# Patient Record
Sex: MALE | Race: WHITE | Employment: FULL TIME | URBAN - METROPOLITAN AREA
[De-identification: names, ages, dates, MRNs, and addresses within clinical notes are randomized per-mention and may not be internally consistent; named-entity substitution may affect disease eponyms.]

---

## 2017-03-06 RX ORDER — ZOLPIDEM TARTRATE 10 MG/1
TABLET ORAL
Qty: 30 TABLET | Refills: 1 | Status: SHIPPED | OUTPATIENT
Start: 2017-03-06

## 2024-07-22 ENCOUNTER — HOSPITAL ENCOUNTER (EMERGENCY)
Age: 64
Discharge: HOME | End: 2024-07-22
Payer: MEDICARE

## 2024-07-22 VITALS
TEMPERATURE: 98.1 F | DIASTOLIC BLOOD PRESSURE: 70 MMHG | SYSTOLIC BLOOD PRESSURE: 130 MMHG | OXYGEN SATURATION: 97 % | RESPIRATION RATE: 12 BRPM | WEIGHT: 177.47 LBS | HEART RATE: 76 BPM | BODY MASS INDEX: 24.75 KG/M2

## 2024-07-22 DIAGNOSIS — L03.113 CELLULITIS OF RIGHT HAND EXCLUDING FINGERS AND THUMB: Primary | ICD-10-CM

## 2024-07-22 LAB
ANION GAP SERPL CALCULATED.3IONS-SCNC: 12 MMOL/L (ref 3–16)
BASOPHILS # BLD: 0.1 K/UL (ref 0–0.2)
BASOPHILS NFR BLD: 0.7 %
BUN SERPL-MCNC: 35 MG/DL (ref 7–20)
CALCIUM SERPL-MCNC: 10.3 MG/DL (ref 8.3–10.6)
CHLORIDE SERPL-SCNC: 98 MMOL/L (ref 99–110)
CO2 SERPL-SCNC: 24 MMOL/L (ref 21–32)
CREAT SERPL-MCNC: 1.2 MG/DL (ref 0.8–1.3)
DEPRECATED RDW RBC AUTO: 13.5 % (ref 12.4–15.4)
EOSINOPHIL # BLD: 0.9 K/UL (ref 0–0.6)
EOSINOPHIL NFR BLD: 7.4 %
GFR SERPLBLD CREATININE-BSD FMLA CKD-EPI: 67 ML/MIN/{1.73_M2}
GLUCOSE SERPL-MCNC: 120 MG/DL (ref 70–99)
HCT VFR BLD AUTO: 41.8 % (ref 40.5–52.5)
HGB BLD-MCNC: 14.5 G/DL (ref 13.5–17.5)
LACTATE BLDV-SCNC: 1.2 MMOL/L (ref 0.4–2)
LYMPHOCYTES # BLD: 1.4 K/UL (ref 1–5.1)
LYMPHOCYTES NFR BLD: 10.6 %
MCH RBC QN AUTO: 30.7 PG (ref 26–34)
MCHC RBC AUTO-ENTMCNC: 34.6 G/DL (ref 31–36)
MCV RBC AUTO: 88.6 FL (ref 80–100)
MONOCYTES # BLD: 1.2 K/UL (ref 0–1.3)
MONOCYTES NFR BLD: 9.5 %
NEUTROPHILS # BLD: 9.2 K/UL (ref 1.7–7.7)
NEUTROPHILS NFR BLD: 71.8 %
PLATELET # BLD AUTO: 324 K/UL (ref 135–450)
PMV BLD AUTO: 7.3 FL (ref 5–10.5)
POTASSIUM SERPL-SCNC: 3.9 MMOL/L (ref 3.5–5.1)
RBC # BLD AUTO: 4.72 M/UL (ref 4.2–5.9)
SODIUM SERPL-SCNC: 134 MMOL/L (ref 136–145)
WBC # BLD AUTO: 12.9 K/UL (ref 4–11)

## 2024-07-22 PROCEDURE — 83605 ASSAY OF LACTIC ACID: CPT

## 2024-07-22 PROCEDURE — 85025 COMPLETE CBC W/AUTO DIFF WBC: CPT

## 2024-07-22 PROCEDURE — 6370000000 HC RX 637 (ALT 250 FOR IP)

## 2024-07-22 PROCEDURE — 80048 BASIC METABOLIC PNL TOTAL CA: CPT

## 2024-07-22 PROCEDURE — 99283 EMERGENCY DEPT VISIT LOW MDM: CPT

## 2024-07-22 RX ORDER — CEPHALEXIN 500 MG/1
500 CAPSULE ORAL ONCE
Status: COMPLETED | OUTPATIENT
Start: 2024-07-22 | End: 2024-07-22

## 2024-07-22 RX ORDER — NAPROXEN 250 MG/1
500 TABLET ORAL ONCE
Status: COMPLETED | OUTPATIENT
Start: 2024-07-22 | End: 2024-07-22

## 2024-07-22 RX ORDER — CEPHALEXIN 500 MG/1
500 CAPSULE ORAL 2 TIMES DAILY
Qty: 14 CAPSULE | Refills: 0 | Status: SHIPPED | OUTPATIENT
Start: 2024-07-22 | End: 2024-07-28

## 2024-07-22 RX ORDER — NAPROXEN SODIUM 550 MG/1
550 TABLET ORAL 2 TIMES DAILY WITH MEALS
Qty: 60 TABLET | Refills: 3 | Status: SHIPPED | OUTPATIENT
Start: 2024-07-22 | End: 2024-07-28

## 2024-07-22 RX ADMIN — NAPROXEN SODIUM 500 MG: 250 TABLET ORAL at 23:22

## 2024-07-22 RX ADMIN — CEPHALEXIN 500 MG: 500 CAPSULE ORAL at 22:23

## 2024-07-23 ASSESSMENT — ENCOUNTER SYMPTOMS
SINUS PRESSURE: 0
SORE THROAT: 0
SINUS PAIN: 0
WHEEZING: 0
ABDOMINAL PAIN: 0
NAUSEA: 0
RHINORRHEA: 0
VOMITING: 0
TROUBLE SWALLOWING: 0
CHEST TIGHTNESS: 0
SHORTNESS OF BREATH: 0
COUGH: 0
COLOR CHANGE: 1

## 2024-07-23 NOTE — ED PROVIDER NOTES
[facility] EMERGENCY DEPARTMENT  EMERGENCY DEPARTMENT ENCOUNTER        Pt Name: Drew Chisholm  MRN: [id number]  Birthdate 1960  Date of evaluation: 7/22/2024  Provider: DC Quinones - CNP  PCP: Jean-Paul Alatorre MD  Note Started: 7:56 PM EDT 7/23/24      DIANA. I have evaluated this patient.        CHIEF COMPLAINT       Chief Complaint   Patient presents with    Hand Pain     Right hand swelling.  Righ thand pain that radiated up to upper arm.   Onset of of swelling this am.  Hand is warm to the touch.        HISTORY OF PRESENT ILLNESS: 1 or more Elements     History From: Patient    Chief Complaint: Right hand pain    Drew Chisholm is a 64 y.o. male who presents to the emergency department for evaluation of right hand pain.  First noted this morning upon awakening.  Denies any preceding traumatic injury.  States that he also noted some redness and warmth to the touch.  Does not believe that he sustained any insect stings or insect bites.  Patient is right-hand dominant nondiabetic.  No associated fevers, chills, nausea, vomiting.    Nursing Notes were all reviewed and agreed with or any disagreements were addressed in the HPI.    REVIEW OF SYSTEMS :      Review of Systems   Constitutional:  Negative for chills, fatigue and fever.   HENT:  Negative for congestion, postnasal drip, rhinorrhea, sinus pressure, sinus pain, sneezing, sore throat and trouble swallowing.    Respiratory:  Negative for cough, chest tightness, shortness of breath and wheezing.    Cardiovascular:  Negative for chest pain and palpitations.   Gastrointestinal:  Negative for abdominal pain, nausea and vomiting.   Musculoskeletal:  Positive for myalgias. Negative for arthralgias and neck pain.   Skin:  Positive for color change.   Allergic/Immunologic: Negative for immunocompromised state.   Neurological:  Negative for dizziness, weakness, light-headedness and headaches.   Hematological:  Does not bruise/bleed

## 2024-07-23 NOTE — DISCHARGE INSTRUCTIONS
Blood work was reassuring and I am not concerned for systemic infection.  Exam is most consistent with a localized cellulitis to the dorsum of the right hand.  You were given your first dose of antibiotics in the emergency department a prescription was sent to your pharmacy for completion.   and take as directed.  Make sure to complete entirely.  Do not stop early if symptoms improve or resolve.    I did send naproxen which is a pain medication.  It is in the same drug family as Motrin, ibuprofen, Aleve, Advil, meloxicam, diclofenac.  You can take Tylenol with this medication but do not take additional nonsteroidal anti-inflammatory drugs.  Elevate and apply ice 2-3 times a day for 10 to 15 minutes at a time.  Schedule follow-up with your primary care provider in 2 to 3 days for wound check.    Please return if worse and you are not able to get in with your primary care doctor for reevaluation

## 2024-07-28 ENCOUNTER — HOSPITAL ENCOUNTER (EMERGENCY)
Age: 64
Discharge: HOME | End: 2024-07-28
Payer: MEDICARE

## 2024-07-28 VITALS
TEMPERATURE: 98.3 F | OXYGEN SATURATION: 98 % | HEIGHT: 70 IN | SYSTOLIC BLOOD PRESSURE: 134 MMHG | RESPIRATION RATE: 16 BRPM | WEIGHT: 168.87 LBS | DIASTOLIC BLOOD PRESSURE: 77 MMHG | BODY MASS INDEX: 24.18 KG/M2 | HEART RATE: 62 BPM

## 2024-07-28 DIAGNOSIS — M25.50 POLYARTHRALGIA: Primary | ICD-10-CM

## 2024-07-28 DIAGNOSIS — R79.82 ELEVATED C-REACTIVE PROTEIN (CRP): ICD-10-CM

## 2024-07-28 DIAGNOSIS — R70.0 ELEVATED SED RATE: ICD-10-CM

## 2024-07-28 DIAGNOSIS — E79.0 ELEVATED URIC ACID IN BLOOD: ICD-10-CM

## 2024-07-28 DIAGNOSIS — D72.829 LEUKOCYTOSIS, UNSPECIFIED TYPE: ICD-10-CM

## 2024-07-28 LAB
ANION GAP SERPL CALCULATED.3IONS-SCNC: 13 MMOL/L (ref 3–16)
BASOPHILS # BLD: 0.1 K/UL (ref 0–0.2)
BASOPHILS NFR BLD: 1.1 %
BUN SERPL-MCNC: 30 MG/DL (ref 7–20)
CALCIUM SERPL-MCNC: 9.6 MG/DL (ref 8.3–10.6)
CHLORIDE SERPL-SCNC: 98 MMOL/L (ref 99–110)
CO2 SERPL-SCNC: 22 MMOL/L (ref 21–32)
CREAT SERPL-MCNC: 1.1 MG/DL (ref 0.8–1.3)
CRP SERPL-MCNC: 144.4 MG/L (ref 0–5.1)
DEPRECATED RDW RBC AUTO: 13.7 % (ref 12.4–15.4)
EOSINOPHIL # BLD: 1.6 K/UL (ref 0–0.6)
EOSINOPHIL NFR BLD: 12.3 %
ERYTHROCYTE [SEDIMENTATION RATE] IN BLOOD BY WESTERGREN METHOD: 96 MM/HR (ref 0–20)
GFR SERPLBLD CREATININE-BSD FMLA CKD-EPI: 74 ML/MIN/{1.73_M2}
GLUCOSE SERPL-MCNC: 152 MG/DL (ref 70–99)
HCT VFR BLD AUTO: 38.9 % (ref 40.5–52.5)
HGB BLD-MCNC: 13.2 G/DL (ref 13.5–17.5)
LYMPHOCYTES # BLD: 1.4 K/UL (ref 1–5.1)
LYMPHOCYTES NFR BLD: 10.8 %
MCH RBC QN AUTO: 30.1 PG (ref 26–34)
MCHC RBC AUTO-ENTMCNC: 34 G/DL (ref 31–36)
MCV RBC AUTO: 88.5 FL (ref 80–100)
MONOCYTES # BLD: 1 K/UL (ref 0–1.3)
MONOCYTES NFR BLD: 7.6 %
NEUTROPHILS # BLD: 8.8 K/UL (ref 1.7–7.7)
NEUTROPHILS NFR BLD: 68.2 %
PLATELET # BLD AUTO: 520 K/UL (ref 135–450)
PMV BLD AUTO: 6.8 FL (ref 5–10.5)
POTASSIUM SERPL-SCNC: 3.7 MMOL/L (ref 3.5–5.1)
RBC # BLD AUTO: 4.4 M/UL (ref 4.2–5.9)
RHEUMATOID FACT SER IA-ACNC: 19 IU/ML
SODIUM SERPL-SCNC: 133 MMOL/L (ref 136–145)
URATE SERPL-MCNC: 10 MG/DL (ref 3.5–7.2)
WBC # BLD AUTO: 12.9 K/UL (ref 4–11)

## 2024-07-28 PROCEDURE — 85652 RBC SED RATE AUTOMATED: CPT

## 2024-07-28 PROCEDURE — 86038 ANTINUCLEAR ANTIBODIES: CPT

## 2024-07-28 PROCEDURE — 99283 EMERGENCY DEPT VISIT LOW MDM: CPT

## 2024-07-28 PROCEDURE — 6370000000 HC RX 637 (ALT 250 FOR IP): Performed by: NURSE PRACTITIONER

## 2024-07-28 PROCEDURE — 80048 BASIC METABOLIC PNL TOTAL CA: CPT

## 2024-07-28 PROCEDURE — 84550 ASSAY OF BLOOD/URIC ACID: CPT

## 2024-07-28 PROCEDURE — 85025 COMPLETE CBC W/AUTO DIFF WBC: CPT

## 2024-07-28 PROCEDURE — 86140 C-REACTIVE PROTEIN: CPT

## 2024-07-28 PROCEDURE — 86431 RHEUMATOID FACTOR QUANT: CPT

## 2024-07-28 RX ORDER — COLCHICINE 0.6 MG/1
1.2 TABLET ORAL ONCE
Status: COMPLETED | OUTPATIENT
Start: 2024-07-28 | End: 2024-07-28

## 2024-07-28 RX ORDER — PREDNISONE 20 MG/1
TABLET ORAL
Qty: 18 TABLET | Refills: 0 | Status: SHIPPED | OUTPATIENT
Start: 2024-07-28 | End: 2024-08-07

## 2024-07-28 RX ORDER — MELOXICAM 7.5 MG/1
7.5 TABLET ORAL DAILY
Qty: 10 TABLET | Refills: 0 | Status: SHIPPED | OUTPATIENT
Start: 2024-07-28 | End: 2024-08-07

## 2024-07-28 RX ORDER — COLCHICINE 0.6 MG/1
0.6 TABLET ORAL 2 TIMES DAILY
Qty: 10 TABLET | Refills: 0 | Status: SHIPPED | OUTPATIENT
Start: 2024-07-28 | End: 2024-08-02

## 2024-07-28 RX ORDER — MELOXICAM 7.5 MG/1
7.5 TABLET ORAL ONCE
Status: COMPLETED | OUTPATIENT
Start: 2024-07-28 | End: 2024-07-28

## 2024-07-28 RX ORDER — PREDNISONE 20 MG/1
60 TABLET ORAL ONCE
Status: COMPLETED | OUTPATIENT
Start: 2024-07-28 | End: 2024-07-28

## 2024-07-28 RX ADMIN — COLCHICINE 1.2 MG: 0.6 TABLET, FILM COATED ORAL at 22:23

## 2024-07-28 RX ADMIN — PREDNISONE 60 MG: 20 TABLET ORAL at 20:23

## 2024-07-28 RX ADMIN — MELOXICAM 7.5 MG: 7.5 TABLET ORAL at 22:23

## 2024-07-28 ASSESSMENT — PAIN SCALES - GENERAL
PAINLEVEL_OUTOF10: 1
PAINLEVEL_OUTOF10: 3
PAINLEVEL_OUTOF10: 6

## 2024-07-28 ASSESSMENT — PAIN - FUNCTIONAL ASSESSMENT
PAIN_FUNCTIONAL_ASSESSMENT: 0-10
PAIN_FUNCTIONAL_ASSESSMENT: 0-10

## 2024-07-28 NOTE — ED PROVIDER NOTES
[facility] EMERGENCY DEPARTMENT  [address] [address]  Dept: [phone]  Loc: [phone]  eMERGENCYdEPARTMENT eNCOUnter      Pt Name: Drew Chisholm  MRN: [id number]  Birthdate 1960  Date of evaluation: 7/28/2024  Provider:DC Dotson CNP      Independently seen and evaluated by the advanced practice per  CHIEF COMPLAINT       Chief Complaint   Patient presents with    Joint Pain     Patient was seen and treated Monday for cellulitis and states that he is still having pain in his joints, his hands are the worse, but has pain in his shoulders, knees, and back 6/10        CRITICAL CARE TIME       HISTORY OF PRESENT ILLNESS  (Location/Symptom, Timing/Onset, Context/Setting, Quality, Duration,Modifying Factors, Severity.)   Drew Chisholm is a 64 y.o. male who presents to the emergency department PMHx acid reflux hypertension hyperlipidemia    Lives at home  CODE STATUS full  Social determinants: He is retired but also has a job at [employer].  Reports he has a very physical person.  Anticoagulation therapy none    HPI provided by the patient:     Patient presents to the emergency department reporting that he was seen here several days ago and was prescribed antibiotic for right hand cellulitis.  He states that his knuckles were swollen and sore but he had no prior trauma or injury.  He reports that after taking the antibiotic for couple days and the symptoms seem to improve however and since today he has noticed that the right hand is swollen, not as much as it was before.  And he is experiencing bilateral elbow shoulder and bilateral knee pain.  States he is never experienced this before.      Nursing Notes were reviewedand agreed with or any disagreements were addressed in the HPI.    REVIEW OF SYSTEMS    (2-9 systems for level 4, 10 or more for level 5)     Review of Systems   Constitutional:  Negative for activity

## 2024-07-28 NOTE — ED TRIAGE NOTES
.Drew Chisholm is a 64 y.o. male brought himself to the ER for eval of joint pain. The patient states that he was treated here Tuesday for cellulitis and is having joint pain. The patient states that the pain is worse in his hands but also in his knees, shoulders, and back. The patient states that the pain is a 6/10. The patient is alert and oriented with an open and patent airway with a normal respiratory effort.

## 2024-07-29 LAB — ANA SER QL IA: NEGATIVE

## 2024-07-29 NOTE — ED NOTES
D/C: Order noted for d/c. Pt confirmed d/c paperwork has correct name. Discharge and education instructions reviewed with patient. Teach-back successful.  Pt verbalized understanding and denied questions at this time. No acute distress noted. Patient instructed to follow-up as noted - return to emergency department if symptoms worsen. Patient verbalized understanding. Discharged per EDMD with discharge instructions. Pt discharged to private vehicle. Patient stable upon departure. Thanked patient for [facility] for care. Provider aware of patient pain at time of discharge.

## 2024-07-29 NOTE — ED NOTES
D/C: Order noted for d/c. Pt confirmed d/c paperwork has correct name. Discharge and education instructions reviewed with patient. Teach-back successful.  Pt verbalized understanding and denied questions at this time. No acute distress noted. Patient instructed to follow-up as noted - return to emergency department if symptoms worsen. Patient verbalized understanding. Discharged per EDMD with discharge instructions. Pt discharged to private vehicle. Patient stable upon departure. Thanked patient for [facility] for care. Provider aware of patient pain at time of discharge.

## 2024-07-30 ASSESSMENT — ENCOUNTER SYMPTOMS
GASTROINTESTINAL NEGATIVE: 1
RESPIRATORY NEGATIVE: 1

## 2024-08-15 ENCOUNTER — APPOINTMENT (OUTPATIENT)
Dept: GENERAL RADIOLOGY | Age: 64
End: 2024-08-15
Payer: MEDICARE

## 2024-08-15 ENCOUNTER — HOSPITAL ENCOUNTER (EMERGENCY)
Age: 64
Discharge: HOME | End: 2024-08-15
Payer: MEDICARE

## 2024-08-15 VITALS
WEIGHT: 165.57 LBS | DIASTOLIC BLOOD PRESSURE: 70 MMHG | SYSTOLIC BLOOD PRESSURE: 114 MMHG | RESPIRATION RATE: 16 BRPM | TEMPERATURE: 98.7 F | BODY MASS INDEX: 23.7 KG/M2 | HEART RATE: 68 BPM | OXYGEN SATURATION: 96 % | HEIGHT: 70 IN

## 2024-08-15 DIAGNOSIS — M25.551 PAIN OF RIGHT HIP: Primary | ICD-10-CM

## 2024-08-15 DIAGNOSIS — M25.511 ACUTE PAIN OF RIGHT SHOULDER: ICD-10-CM

## 2024-08-15 LAB
ANION GAP SERPL CALCULATED.3IONS-SCNC: 11 MMOL/L (ref 3–16)
BASOPHILS # BLD: 0 K/UL (ref 0–0.2)
BASOPHILS NFR BLD: 0 %
BUN SERPL-MCNC: 26 MG/DL (ref 7–20)
CALCIUM SERPL-MCNC: 8.7 MG/DL (ref 8.3–10.6)
CHLORIDE SERPL-SCNC: 90 MMOL/L (ref 99–110)
CO2 SERPL-SCNC: 24 MMOL/L (ref 21–32)
CREAT SERPL-MCNC: 1 MG/DL (ref 0.8–1.3)
CRP SERPL-MCNC: 156 MG/L (ref 0–5.1)
DEPRECATED RDW RBC AUTO: 13.8 % (ref 12.4–15.4)
EOSINOPHIL # BLD: 0.1 K/UL (ref 0–0.6)
EOSINOPHIL NFR BLD: 1 %
ERYTHROCYTE [SEDIMENTATION RATE] IN BLOOD BY WESTERGREN METHOD: 90 MM/HR (ref 0–20)
GFR SERPLBLD CREATININE-BSD FMLA CKD-EPI: 83 ML/MIN/{1.73_M2}
GLUCOSE SERPL-MCNC: 200 MG/DL (ref 70–99)
HCT VFR BLD AUTO: 40.8 % (ref 40.5–52.5)
HGB BLD-MCNC: 13.8 G/DL (ref 13.5–17.5)
LYMPHOCYTES # BLD: 1.2 K/UL (ref 1–5.1)
LYMPHOCYTES NFR BLD: 8 %
MCH RBC QN AUTO: 30.1 PG (ref 26–34)
MCHC RBC AUTO-ENTMCNC: 33.8 G/DL (ref 31–36)
MCV RBC AUTO: 89 FL (ref 80–100)
MONOCYTES # BLD: 1.4 K/UL (ref 0–1.3)
MONOCYTES NFR BLD: 10 %
NEUTROPHILS # BLD: 11.7 K/UL (ref 1.7–7.7)
NEUTROPHILS NFR BLD: 74 %
NEUTS BAND NFR BLD MANUAL: 7 % (ref 0–7)
PLATELET # BLD AUTO: 400 K/UL (ref 135–450)
PLATELET BLD QL SMEAR: ADEQUATE
PMV BLD AUTO: 7.7 FL (ref 5–10.5)
POTASSIUM SERPL-SCNC: 3.8 MMOL/L (ref 3.5–5.1)
RBC # BLD AUTO: 4.59 M/UL (ref 4.2–5.9)
SLIDE REVIEW: ABNORMAL
SODIUM SERPL-SCNC: 125 MMOL/L (ref 136–145)
URATE SERPL-MCNC: 7.9 MG/DL (ref 3.5–7.2)
WBC # BLD AUTO: 14.4 K/UL (ref 4–11)

## 2024-08-15 PROCEDURE — 85025 COMPLETE CBC W/AUTO DIFF WBC: CPT

## 2024-08-15 PROCEDURE — 73502 X-RAY EXAM HIP UNI 2-3 VIEWS: CPT

## 2024-08-15 PROCEDURE — 80048 BASIC METABOLIC PNL TOTAL CA: CPT

## 2024-08-15 PROCEDURE — 99284 EMERGENCY DEPT VISIT MOD MDM: CPT

## 2024-08-15 PROCEDURE — 73030 X-RAY EXAM OF SHOULDER: CPT

## 2024-08-15 PROCEDURE — 86140 C-REACTIVE PROTEIN: CPT

## 2024-08-15 PROCEDURE — 85652 RBC SED RATE AUTOMATED: CPT

## 2024-08-15 PROCEDURE — 84550 ASSAY OF BLOOD/URIC ACID: CPT

## 2024-08-15 RX ORDER — PREDNISONE 20 MG/1
20 TABLET ORAL 2 TIMES DAILY
Qty: 10 TABLET | Refills: 0 | Status: SHIPPED | OUTPATIENT
Start: 2024-08-15 | End: 2024-08-20

## 2024-08-15 ASSESSMENT — PAIN DESCRIPTION - LOCATION: LOCATION: HIP;SHOULDER

## 2024-08-15 ASSESSMENT — LIFESTYLE VARIABLES: HOW OFTEN DO YOU HAVE A DRINK CONTAINING ALCOHOL: NEVER

## 2024-08-15 ASSESSMENT — PAIN DESCRIPTION - ORIENTATION: ORIENTATION: RIGHT

## 2024-08-15 ASSESSMENT — PAIN - FUNCTIONAL ASSESSMENT: PAIN_FUNCTIONAL_ASSESSMENT: 0-10

## 2024-08-15 ASSESSMENT — PAIN DESCRIPTION - FREQUENCY: FREQUENCY: CONTINUOUS

## 2024-08-15 ASSESSMENT — PAIN DESCRIPTION - DESCRIPTORS: DESCRIPTORS: SHOOTING

## 2024-08-15 ASSESSMENT — PAIN DESCRIPTION - PAIN TYPE: TYPE: ACUTE PAIN

## 2024-08-15 ASSESSMENT — PAIN SCALES - GENERAL: PAINLEVEL_OUTOF10: 8

## 2024-08-15 NOTE — DISCHARGE INSTRUCTIONS
Drew,    Thank you for choosing [facility] and allowing us to provide care    Prednisone was prescribed, you may take ibuprofen as needed.  Please follow-up with your PCP within the week for reassessment after ED visit.    Return to the ED if you experience new or worsening symptoms

## 2024-08-15 NOTE — ED NOTES
Patient DCed from ED at this time. Discussed AVS, follow up, and scripts. They verbalized understanding. Reinforced that should symptoms persist or worsen to return to the ED. They verbalized understanding. Patient ambulated out of ED. RN thanked patient for choosing [facility].

## 2024-08-16 NOTE — ED PROVIDER NOTES
Course, and Reassessment: ***    Disposition Considerations (tests considered but not done, Admit vs D/C, Shared Decision Making, Pt Expectation of Test or Tx.): ***     The patient tolerated their visit well.  The patient and / or the family were informed of the results of any tests, a time was given to answer questions, a plan was proposed and they agreed with plan.    I am the Primary Clinician of Record.  FINAL IMPRESSION      1. Pain of right hip    2. Acute pain of right shoulder          DISPOSITION/PLAN     DISPOSITION Decision To Discharge 08/15/2024 01:43:41 PM  Condition at Disposition: Good      PATIENT REFERRED TO:  Jean-Paul Alatorre MD  [address]  [address]  [facility] [address]  [phone]    In 1 week      [facility] Emergency Department  [address] [address]  [phone]    If symptoms worsen      DISCHARGE MEDICATIONS:  Discharge Medication List as of 8/15/2024  1:52 PM        START taking these medications    Details   predniSONE (DELTASONE) 20 MG tablet Take 1 tablet by mouth 2 times daily for 5 days, Disp-10 tablet, R-0Normal             DISCONTINUED MEDICATIONS:  Discharge Medication List as of 8/15/2024  1:52 PM                 (Please note that portions of this note were completed with a voice recognition program.  Efforts were made to edit the dictations but occasionally words are mis-transcribed.)    Dilma Rodríguez PA-C (electronically signed)   Hypertension.    CONSULTS: (Who and What was discussed)  None      Records Reviewed (External and Source)     CC/HPI Summary, DDx, ED Course, and Reassessment:   history of gout who presents to the ED with a concern of right hip and shoulder pain that started 3 weeks ago but has been progressive getting worse.  Patient was seen on 7/28 and was discharged home with prednisone and colchicine.  Denies fevers, nausea, vomiting, no swelling, redness of the hip and shoulder joint.    Ddx: arthritis, gout    Physical examination show an alert and oriented male, nontachycardic, temperature 98.7, SpO2 96% on room air.  Lungs were clear to auscultation upper and lower lobes bilaterally.  Heart auscultation was clear, no murmurs noted.  4/5 ISREAL and ROM in upper and lower extremity bilaterally.  No redness, swelling noted.  Patient able to ambulate in the ED.    Leukocytosis of 14.4, left shift of 11.7, elevated CRP and ESR, leukocytosis could be due to inflammatory etiology, septic arthritis has low suspicion, no redness, warmth or edema noted.  Xray showed no abnormalities, arthritis was noted.    Patient was hemodynamically stable, no acute distress noted  Uric acid was elevated, recently completed  colchicine treatment, denies history of diabetes, prednisone was prescribed.   Patient was encouraged to follow up with PCP within the week for reassessment after ED visit.  Return precautions discussed, patient agrees to the plan.    Patient discharged home in stable condition, no acute distress, the airway was not compromised, non-tachycardic, afebrile, and able to ambulate in the ED.    The patient tolerated their visit well.  The patient and / or the family were informed of the results of any tests, a time was given to answer questions, a plan was proposed and they agreed with plan.    I am the Primary Clinician of Record.  FINAL IMPRESSION      1. Pain of right hip    2. Acute pain of right shoulder          DISPOSITION/PLAN

## 2025-05-22 ENCOUNTER — APPOINTMENT (OUTPATIENT)
Dept: CT IMAGING | Age: 65
End: 2025-05-22
Payer: MEDICARE

## 2025-05-22 ENCOUNTER — HOSPITAL ENCOUNTER (OUTPATIENT)
Age: 65
Setting detail: OBSERVATION
Discharge: HOME | End: 2025-05-23
Attending: EMERGENCY MEDICINE
Payer: MEDICARE

## 2025-05-22 ENCOUNTER — APPOINTMENT (OUTPATIENT)
Dept: GENERAL RADIOLOGY | Age: 65
End: 2025-05-22
Payer: MEDICARE

## 2025-05-22 DIAGNOSIS — R42 VERTIGO: Primary | ICD-10-CM

## 2025-05-22 DIAGNOSIS — R79.89 ELEVATED TROPONIN: ICD-10-CM

## 2025-05-22 LAB
ALBUMIN SERPL-MCNC: 3.7 G/DL (ref 3.4–5)
ALBUMIN/GLOB SERPL: 1 {RATIO} (ref 1.1–2.2)
ALP SERPL-CCNC: 117 U/L (ref 40–129)
ALT SERPL-CCNC: 15 U/L (ref 10–40)
ANION GAP SERPL CALCULATED.3IONS-SCNC: 13 MMOL/L (ref 3–16)
AST SERPL-CCNC: 28 U/L (ref 15–37)
BASOPHILS # BLD: 0.1 K/UL (ref 0–0.2)
BASOPHILS NFR BLD: 0.9 %
BILIRUB SERPL-MCNC: 0.4 MG/DL (ref 0–1)
BUN SERPL-MCNC: 19 MG/DL (ref 7–20)
CALCIUM SERPL-MCNC: 9.3 MG/DL (ref 8.3–10.6)
CHLORIDE SERPL-SCNC: 101 MMOL/L (ref 99–110)
CO2 SERPL-SCNC: 21 MMOL/L (ref 21–32)
CREAT SERPL-MCNC: 1 MG/DL (ref 0.8–1.3)
DEPRECATED RDW RBC AUTO: 14.9 % (ref 12.4–15.4)
EOSINOPHIL # BLD: 0.7 K/UL (ref 0–0.6)
EOSINOPHIL NFR BLD: 7.8 %
GFR SERPLBLD CREATININE-BSD FMLA CKD-EPI: 82 ML/MIN/{1.73_M2}
GLUCOSE BLD-MCNC: 139 MG/DL (ref 70–99)
GLUCOSE BLD-MCNC: 63 MG/DL (ref 70–99)
GLUCOSE SERPL-MCNC: 144 MG/DL (ref 70–99)
HCT VFR BLD AUTO: 41.9 % (ref 40.5–52.5)
HGB BLD-MCNC: 14.8 G/DL (ref 13.5–17.5)
INR PPP: 0.97 (ref 0.85–1.15)
LYMPHOCYTES # BLD: 2.3 K/UL (ref 1–5.1)
LYMPHOCYTES NFR BLD: 25 %
MCH RBC QN AUTO: 32.2 PG (ref 26–34)
MCHC RBC AUTO-ENTMCNC: 35.3 G/DL (ref 31–36)
MCV RBC AUTO: 91.3 FL (ref 80–100)
MONOCYTES # BLD: 0.7 K/UL (ref 0–1.3)
MONOCYTES NFR BLD: 7.7 %
NEUTROPHILS # BLD: 5.4 K/UL (ref 1.7–7.7)
NEUTROPHILS NFR BLD: 58.6 %
PERFORMED ON: ABNORMAL
PERFORMED ON: ABNORMAL
PLATELET # BLD AUTO: 348 K/UL (ref 135–450)
PMV BLD AUTO: 7.1 FL (ref 5–10.5)
POTASSIUM SERPL-SCNC: 3.8 MMOL/L (ref 3.5–5.1)
PROT SERPL-MCNC: 7.5 G/DL (ref 6.4–8.2)
PROTHROMBIN TIME: 13.1 SEC (ref 11.9–14.9)
RBC # BLD AUTO: 4.59 M/UL (ref 4.2–5.9)
SODIUM SERPL-SCNC: 135 MMOL/L (ref 136–145)
TROPONIN, HIGH SENSITIVITY: 44 NG/L (ref 0–22)
TROPONIN, HIGH SENSITIVITY: 45 NG/L (ref 0–22)
WBC # BLD AUTO: 9.1 K/UL (ref 4–11)

## 2025-05-22 PROCEDURE — 85610 PROTHROMBIN TIME: CPT

## 2025-05-22 PROCEDURE — 36415 COLL VENOUS BLD VENIPUNCTURE: CPT

## 2025-05-22 PROCEDURE — 85025 COMPLETE CBC W/AUTO DIFF WBC: CPT

## 2025-05-22 PROCEDURE — 6370000000 HC RX 637 (ALT 250 FOR IP): Performed by: EMERGENCY MEDICINE

## 2025-05-22 PROCEDURE — G0378 HOSPITAL OBSERVATION PER HR: HCPCS

## 2025-05-22 PROCEDURE — 2580000003 HC RX 258: Performed by: HOSPITALIST

## 2025-05-22 PROCEDURE — 70496 CT ANGIOGRAPHY HEAD: CPT

## 2025-05-22 PROCEDURE — 70450 CT HEAD/BRAIN W/O DYE: CPT

## 2025-05-22 PROCEDURE — 6360000004 HC RX CONTRAST MEDICATION: Performed by: EMERGENCY MEDICINE

## 2025-05-22 PROCEDURE — 80053 COMPREHEN METABOLIC PANEL: CPT

## 2025-05-22 PROCEDURE — 99285 EMERGENCY DEPT VISIT HI MDM: CPT

## 2025-05-22 PROCEDURE — 2500000003 HC RX 250 WO HCPCS: Performed by: HOSPITALIST

## 2025-05-22 PROCEDURE — 71045 X-RAY EXAM CHEST 1 VIEW: CPT

## 2025-05-22 PROCEDURE — 84484 ASSAY OF TROPONIN QUANT: CPT

## 2025-05-22 RX ORDER — POLYETHYLENE GLYCOL 3350 17 G/17G
17 POWDER, FOR SOLUTION ORAL DAILY PRN
Status: DISCONTINUED | OUTPATIENT
Start: 2025-05-22 | End: 2025-05-23 | Stop reason: HOSPADM

## 2025-05-22 RX ORDER — SENNOSIDES 8.6 MG
325 CAPSULE ORAL DAILY
Status: DISCONTINUED | OUTPATIENT
Start: 2025-05-23 | End: 2025-05-23 | Stop reason: HOSPADM

## 2025-05-22 RX ORDER — MAGNESIUM SULFATE IN WATER 40 MG/ML
2000 INJECTION, SOLUTION INTRAVENOUS PRN
Status: DISCONTINUED | OUTPATIENT
Start: 2025-05-22 | End: 2025-05-23 | Stop reason: HOSPADM

## 2025-05-22 RX ORDER — ALBUTEROL SULFATE 90 UG/1
2 INHALANT RESPIRATORY (INHALATION) 3 TIMES DAILY PRN
Status: DISCONTINUED | OUTPATIENT
Start: 2025-05-22 | End: 2025-05-23 | Stop reason: HOSPADM

## 2025-05-22 RX ORDER — ACETAMINOPHEN 325 MG/1
650 TABLET ORAL EVERY 6 HOURS PRN
Status: DISCONTINUED | OUTPATIENT
Start: 2025-05-22 | End: 2025-05-23 | Stop reason: HOSPADM

## 2025-05-22 RX ORDER — LISINOPRIL 20 MG/1
20 TABLET ORAL DAILY
Status: DISCONTINUED | OUTPATIENT
Start: 2025-05-23 | End: 2025-05-23 | Stop reason: HOSPADM

## 2025-05-22 RX ORDER — SODIUM CHLORIDE 0.9 % (FLUSH) 0.9 %
5-40 SYRINGE (ML) INJECTION PRN
Status: DISCONTINUED | OUTPATIENT
Start: 2025-05-22 | End: 2025-05-23 | Stop reason: HOSPADM

## 2025-05-22 RX ORDER — ACETAMINOPHEN 650 MG/1
650 SUPPOSITORY RECTAL EVERY 6 HOURS PRN
Status: DISCONTINUED | OUTPATIENT
Start: 2025-05-22 | End: 2025-05-23 | Stop reason: HOSPADM

## 2025-05-22 RX ORDER — ZOLPIDEM TARTRATE 5 MG/1
5 TABLET ORAL NIGHTLY PRN
Status: DISCONTINUED | OUTPATIENT
Start: 2025-05-22 | End: 2025-05-22

## 2025-05-22 RX ORDER — ENOXAPARIN SODIUM 100 MG/ML
40 INJECTION SUBCUTANEOUS DAILY
Status: DISCONTINUED | OUTPATIENT
Start: 2025-05-23 | End: 2025-05-23 | Stop reason: HOSPADM

## 2025-05-22 RX ORDER — MECLIZINE HCL 12.5 MG 12.5 MG/1
25 TABLET ORAL ONCE
Status: COMPLETED | OUTPATIENT
Start: 2025-05-22 | End: 2025-05-22

## 2025-05-22 RX ORDER — POTASSIUM CHLORIDE 7.45 MG/ML
10 INJECTION INTRAVENOUS PRN
Status: DISCONTINUED | OUTPATIENT
Start: 2025-05-22 | End: 2025-05-23 | Stop reason: HOSPADM

## 2025-05-22 RX ORDER — ONDANSETRON 4 MG/1
4 TABLET, ORALLY DISINTEGRATING ORAL EVERY 8 HOURS PRN
Status: DISCONTINUED | OUTPATIENT
Start: 2025-05-22 | End: 2025-05-23 | Stop reason: HOSPADM

## 2025-05-22 RX ORDER — POTASSIUM CHLORIDE 1500 MG/1
40 TABLET, EXTENDED RELEASE ORAL PRN
Status: DISCONTINUED | OUTPATIENT
Start: 2025-05-22 | End: 2025-05-23 | Stop reason: HOSPADM

## 2025-05-22 RX ORDER — DICYCLOMINE HCL 20 MG
20 TABLET ORAL 3 TIMES DAILY PRN
Status: DISCONTINUED | OUTPATIENT
Start: 2025-05-22 | End: 2025-05-22

## 2025-05-22 RX ORDER — ONDANSETRON 2 MG/ML
4 INJECTION INTRAMUSCULAR; INTRAVENOUS EVERY 6 HOURS PRN
Status: DISCONTINUED | OUTPATIENT
Start: 2025-05-22 | End: 2025-05-23 | Stop reason: HOSPADM

## 2025-05-22 RX ORDER — SODIUM CHLORIDE 0.9 % (FLUSH) 0.9 %
5-40 SYRINGE (ML) INJECTION EVERY 12 HOURS SCHEDULED
Status: DISCONTINUED | OUTPATIENT
Start: 2025-05-22 | End: 2025-05-23 | Stop reason: HOSPADM

## 2025-05-22 RX ORDER — IOPAMIDOL 755 MG/ML
75 INJECTION, SOLUTION INTRAVASCULAR
Status: COMPLETED | OUTPATIENT
Start: 2025-05-22 | End: 2025-05-22

## 2025-05-22 RX ORDER — SODIUM CHLORIDE 9 MG/ML
INJECTION, SOLUTION INTRAVENOUS PRN
Status: DISCONTINUED | OUTPATIENT
Start: 2025-05-22 | End: 2025-05-23 | Stop reason: HOSPADM

## 2025-05-22 RX ORDER — SODIUM CHLORIDE 9 MG/ML
INJECTION, SOLUTION INTRAVENOUS CONTINUOUS
Status: DISCONTINUED | OUTPATIENT
Start: 2025-05-22 | End: 2025-05-23

## 2025-05-22 RX ADMIN — IOPAMIDOL 75 ML: 755 INJECTION, SOLUTION INTRAVENOUS at 17:43

## 2025-05-22 RX ADMIN — SODIUM CHLORIDE, PRESERVATIVE FREE 10 ML: 5 INJECTION INTRAVENOUS at 21:16

## 2025-05-22 RX ADMIN — SODIUM CHLORIDE: 0.9 INJECTION, SOLUTION INTRAVENOUS at 21:15

## 2025-05-22 RX ADMIN — MECLIZINE HYDROCHLORIDE 25 MG: 12.5 TABLET ORAL at 18:12

## 2025-05-22 ASSESSMENT — LIFESTYLE VARIABLES
HOW OFTEN DO YOU HAVE A DRINK CONTAINING ALCOHOL: NEVER
HOW MANY STANDARD DRINKS CONTAINING ALCOHOL DO YOU HAVE ON A TYPICAL DAY: PATIENT DOES NOT DRINK

## 2025-05-22 NOTE — ED NOTES
ED TO INPATIENT SBAR HANDOFF    Patient Name: Drew Chisholm   Preferred Name: Drew  : 1960  65 y.o.   Family/Caregiver Present: no   Code Status Order: No Order  PO Status: NPO:No  Telemetry Order: Yes  C-SSRS: Risk of Suicide: No Risk  Sitter no   Restraints:     Sepsis Risk Score      Situation  Chief Complaint   Patient presents with    Dizziness     Started around 4pm. States it felt like the room was spinning. Hx of vertigo. Takes half a tab of meclizine twice a day, supposed to take 3 times a day     Brief Description of Patient's Condition: Pt resting in bed. Ambulatory at baseline  Mental Status: oriented, alert, coherent, and logical  Arrived from:Home  Imaging:   CT Head W/O Contrast   Final Result   Addendum (preliminary)    ADDENDUM:   Results were reported to Dr. Mahoney by radiology results communication at 6   p.m. on May 22, 2025.         Final   No acute intracranial abnormality.      Minimal parenchymal volume loss.      Minimal chronic microvascular disease.         CTA Head Neck W/Contrast    (Results Pending)   XR CHEST PORTABLE    (Results Pending)     Abnormal labs:   Abnormal Labs Reviewed   CBC WITH AUTO DIFFERENTIAL - Abnormal; Notable for the following components:       Result Value    Eosinophils Absolute 0.7 (*)     All other components within normal limits   COMPREHENSIVE METABOLIC PANEL W/ REFLEX TO MG FOR LOW K - Abnormal; Notable for the following components:    Sodium 135 (*)     Glucose 144 (*)     Albumin/Globulin Ratio 1.0 (*)     All other components within normal limits   TROPONIN - Abnormal; Notable for the following components:    Troponin, High Sensitivity 45 (*)     All other components within normal limits   POCT GLUCOSE - Abnormal; Notable for the following components:    POC Glucose 63 (*)     All other components within normal limits   POCT GLUCOSE - Abnormal; Notable for the following components:    POC Glucose 139 (*)     All other components within

## 2025-05-22 NOTE — ED PROVIDER NOTES
[facility] EMERGENCY DEPARTMENT  EMERGENCY DEPARTMENT ENCOUNTER      Pt Name: Drew Chisholm  MRN: [id number]  Birthdate 1960  Date of evaluation: 5/22/2025  Provider: Maru Mahoney MD  6:38 PM    CHIEF COMPLAINT       Chief Complaint   Patient presents with    Dizziness     Started around 4pm. States it felt like the room was spinning. Hx of vertigo. Takes half a tab of meclizine twice a day, supposed to take 3 times a day         HISTORY OF PRESENT ILLNESS      Drew Chisholm is a 65 y.o. male who presents to the emergency department with dizziness/room spinning sensation approximately beginning at 4 PM.  Notes mild headache.  Denies vision change, neck pain, paresthesia.  States he has been having intermittent episodes for the last 2 weeks.  No other complaints.    The history is provided by the patient.       Unless otherwise stated in HPI, history was obtained from the patient.     Nursing Notes were reviewed.    REVIEW OF SYSTEMS       ROS negative unless otherwise specified in HPI.      PAST MEDICAL HISTORY     Past Medical History:   Diagnosis Date    Acid reflux     Hyperlipidemia 3/22/2011    Hypertension          SURGICAL HISTORY       Past Surgical History:   Procedure Laterality Date    COLONOSCOPY  12/09/2015    Dr. Keon Corona @ [facility]     KNEE SURGERY      right         CURRENT MEDICATIONS       Previous Medications    ALBUTEROL SULFATE HFA (VENTOLIN HFA) 108 (90 BASE) MCG/ACT INHALER    Inhale 2 puffs into the lungs 3 times daily    BETAMETHASONE VALERATE (VALISONE) 0.1 % OINTMENT    Apply topically 2 times daily.    COLCHICINE (COLCRYS) 0.6 MG TABLET    Take 1 tablet by mouth 2 times daily for 5 days    DEXLANSOPRAZOLE (DEXILANT) 60 MG CPDR CAPSULE    Take 1 capsule by mouth daily    DICYCLOMINE (BENTYL) 20 MG TABLET    Take 1 tablet by mouth 3 times daily as needed (cramping and diarrhea)    DOCUSATE SODIUM (DOCQLACE) 100 MG CAPSULE    Take 1 capsule  Vertigo    2. Elevated troponin          DISPOSITION/PLAN   DISPOSITION Admitted 05/22/2025 06:38:06 PM               PATIENT REFERRED TO:  No follow-up provider specified.    DISCHARGE MEDICATIONS:  New Prescriptions    No medications on file     Controlled Substances Monitoring:     RX Monitoring Attestation Periodic Controlled Substance Monitoring   11/29/2016  10:48 AM The Prescription Monitoring Report for this patient was reviewed today. No signs of potential drug abuse or diversion identified.         (Please note that portions of this note were completed with a voice recognition program.  Efforts were made to edit the dictations but occasionally words are mis-transcribed.)    Maru Mahoney MD (electronically signed)  Attending Emergency Physician          Maru Mahoney MD  05/22/25 3933       Maru Mahoney MD  05/22/25 0619

## 2025-05-23 ENCOUNTER — APPOINTMENT (OUTPATIENT)
Dept: MRI IMAGING | Age: 65
End: 2025-05-23
Payer: MEDICARE

## 2025-05-23 VITALS
WEIGHT: 174.38 LBS | HEART RATE: 63 BPM | HEIGHT: 71 IN | OXYGEN SATURATION: 95 % | DIASTOLIC BLOOD PRESSURE: 73 MMHG | RESPIRATION RATE: 14 BRPM | BODY MASS INDEX: 24.41 KG/M2 | TEMPERATURE: 98.2 F | SYSTOLIC BLOOD PRESSURE: 131 MMHG

## 2025-05-23 LAB
ANION GAP SERPL CALCULATED.3IONS-SCNC: 11 MMOL/L (ref 3–16)
BASOPHILS # BLD: 0.1 K/UL (ref 0–0.2)
BASOPHILS NFR BLD: 0.9 %
BUN SERPL-MCNC: 15 MG/DL (ref 7–20)
CALCIUM SERPL-MCNC: 9.4 MG/DL (ref 8.3–10.6)
CHLORIDE SERPL-SCNC: 105 MMOL/L (ref 99–110)
CO2 SERPL-SCNC: 22 MMOL/L (ref 21–32)
CREAT SERPL-MCNC: 0.7 MG/DL (ref 0.8–1.3)
DEPRECATED RDW RBC AUTO: 15 % (ref 12.4–15.4)
EOSINOPHIL # BLD: 0.7 K/UL (ref 0–0.6)
EOSINOPHIL NFR BLD: 7.9 %
GFR SERPLBLD CREATININE-BSD FMLA CKD-EPI: >90 ML/MIN/{1.73_M2}
GLUCOSE SERPL-MCNC: 92 MG/DL (ref 70–99)
HCT VFR BLD AUTO: 38.6 % (ref 40.5–52.5)
HGB BLD-MCNC: 13.2 G/DL (ref 13.5–17.5)
LYMPHOCYTES # BLD: 1.7 K/UL (ref 1–5.1)
LYMPHOCYTES NFR BLD: 19.9 %
MCH RBC QN AUTO: 31.3 PG (ref 26–34)
MCHC RBC AUTO-ENTMCNC: 34.2 G/DL (ref 31–36)
MCV RBC AUTO: 91.4 FL (ref 80–100)
MONOCYTES # BLD: 0.7 K/UL (ref 0–1.3)
MONOCYTES NFR BLD: 8.2 %
NEUTROPHILS # BLD: 5.4 K/UL (ref 1.7–7.7)
NEUTROPHILS NFR BLD: 63.1 %
PLATELET # BLD AUTO: 368 K/UL (ref 135–450)
PMV BLD AUTO: 7.3 FL (ref 5–10.5)
POTASSIUM SERPL-SCNC: 3.8 MMOL/L (ref 3.5–5.1)
RBC # BLD AUTO: 4.23 M/UL (ref 4.2–5.9)
SODIUM SERPL-SCNC: 138 MMOL/L (ref 136–145)
WBC # BLD AUTO: 8.5 K/UL (ref 4–11)

## 2025-05-23 PROCEDURE — G0378 HOSPITAL OBSERVATION PER HR: HCPCS

## 2025-05-23 PROCEDURE — 85025 COMPLETE CBC W/AUTO DIFF WBC: CPT

## 2025-05-23 PROCEDURE — 2500000003 HC RX 250 WO HCPCS: Performed by: HOSPITALIST

## 2025-05-23 PROCEDURE — 97161 PT EVAL LOW COMPLEX 20 MIN: CPT

## 2025-05-23 PROCEDURE — 70551 MRI BRAIN STEM W/O DYE: CPT

## 2025-05-23 PROCEDURE — 9990000010 HC NO CHARGE VISIT

## 2025-05-23 PROCEDURE — 97530 THERAPEUTIC ACTIVITIES: CPT

## 2025-05-23 PROCEDURE — 80048 BASIC METABOLIC PNL TOTAL CA: CPT

## 2025-05-23 PROCEDURE — 6370000000 HC RX 637 (ALT 250 FOR IP): Performed by: HOSPITALIST

## 2025-05-23 PROCEDURE — 36415 COLL VENOUS BLD VENIPUNCTURE: CPT

## 2025-05-23 PROCEDURE — 94760 N-INVAS EAR/PLS OXIMETRY 1: CPT

## 2025-05-23 PROCEDURE — 6360000002 HC RX W HCPCS: Performed by: HOSPITALIST

## 2025-05-23 PROCEDURE — 96372 THER/PROPH/DIAG INJ SC/IM: CPT

## 2025-05-23 RX ORDER — LISINOPRIL 5 MG/1
5 TABLET ORAL DAILY
Qty: 90 TABLET | Refills: 1 | Status: SHIPPED | OUTPATIENT
Start: 2025-05-23 | End: 2025-05-23 | Stop reason: HOSPADM

## 2025-05-23 RX ORDER — METFORMIN HYDROCHLORIDE 500 MG/1
500 TABLET, EXTENDED RELEASE ORAL
COMMUNITY

## 2025-05-23 RX ORDER — DOXEPIN HYDROCHLORIDE 25 MG/1
25 CAPSULE ORAL NIGHTLY
COMMUNITY

## 2025-05-23 RX ORDER — FOLIC ACID 1 MG/1
1 TABLET ORAL DAILY
COMMUNITY

## 2025-05-23 RX ORDER — ASPIRIN 81 MG/1
81 TABLET ORAL DAILY
COMMUNITY

## 2025-05-23 RX ORDER — FAMOTIDINE 10 MG
10 TABLET ORAL DAILY
COMMUNITY

## 2025-05-23 RX ORDER — MECLIZINE HYDROCHLORIDE 25 MG/1
25 TABLET ORAL 3 TIMES DAILY PRN
COMMUNITY

## 2025-05-23 RX ORDER — LISINOPRIL 20 MG/1
20 TABLET ORAL DAILY
COMMUNITY

## 2025-05-23 RX ORDER — CETIRIZINE HYDROCHLORIDE 10 MG/1
10 TABLET ORAL DAILY
COMMUNITY

## 2025-05-23 RX ORDER — METHOTREXATE 2.5 MG/1
20 TABLET ORAL WEEKLY
COMMUNITY

## 2025-05-23 RX ORDER — MV-MIN NO.113/IRON/FOLIC ACID 4.5 MG-2
2 CAPSULE ORAL DAILY
COMMUNITY

## 2025-05-23 RX ORDER — UBIDECARENONE 50 MG
2 CAPSULE ORAL DAILY
COMMUNITY

## 2025-05-23 RX ORDER — ALLOPURINOL 100 MG/1
100 TABLET ORAL DAILY
Status: DISCONTINUED | OUTPATIENT
Start: 2025-05-23 | End: 2025-05-23 | Stop reason: HOSPADM

## 2025-05-23 RX ORDER — ALLOPURINOL 100 MG/1
100 TABLET ORAL DAILY
COMMUNITY
Start: 2025-05-16

## 2025-05-23 RX ORDER — PANTOPRAZOLE SODIUM 40 MG/1
40 TABLET, DELAYED RELEASE ORAL
Status: DISCONTINUED | OUTPATIENT
Start: 2025-05-23 | End: 2025-05-23 | Stop reason: HOSPADM

## 2025-05-23 RX ADMIN — SODIUM CHLORIDE, PRESERVATIVE FREE 10 ML: 5 INJECTION INTRAVENOUS at 08:28

## 2025-05-23 RX ADMIN — ENOXAPARIN SODIUM 40 MG: 100 INJECTION SUBCUTANEOUS at 08:27

## 2025-05-23 RX ADMIN — LISINOPRIL 20 MG: 20 TABLET ORAL at 08:27

## 2025-05-23 RX ADMIN — PANTOPRAZOLE SODIUM 40 MG: 40 TABLET, DELAYED RELEASE ORAL at 08:53

## 2025-05-23 RX ADMIN — ALLOPURINOL 100 MG: 100 TABLET ORAL at 08:53

## 2025-05-23 RX ADMIN — ASPIRIN 325 MG: 325 TABLET, COATED ORAL at 08:27

## 2025-05-23 NOTE — PROGRESS NOTES
Occupational Therapy  Facility/Department: [address] PROGRESSIVE CARE  Occupational Therapy    Name: Drew Chisholm  : 1960  MRN: [id number]  Date of Service: 2025    OT orders received and chart reviewed. Per chart review and discussion w/pt, pt has been up ad mary and reports IND w/no fxl change in performance w/ADLs and fxl transfers/mobility. Pt declining need for OT services at this time. OT will sign off, please re-order if change in fxl status. No charge.       GRACIA FERNÁNDEZ, OTR/L

## 2025-05-23 NOTE — PROGRESS NOTES
Medication Reconciliation    List of medications patient is currently taking is complete.     Source of information: 1. Conversation with patient at bedside, medication profile from [facility]                                      2. EPIC records      Notes regarding home medications:   1. Added aspirin, alpha lipoic acid, famotidine, red yeast rice, cetirizine, melatonin, allopurinol, lisinopril, doxepin, folic acid, metformin, meclizine, and methotrexate  2. Removed colchicine, dexlansoprazole, dicyclomine, Advair, lisinopril/HCTZ, meloxicam, and zolpidem    Lenore Cannon [facility], PharmD 5/23/2025 11:33 AM

## 2025-05-23 NOTE — PROGRESS NOTES
Pt transferred from ED via stretcher. Ambulated himself to bed. VSS. CMU confirmed. Oriented to room and call light. Admission questions answered, medication list reviewed with pharmacy. No further questions at this time. MRI screening form completed.

## 2025-05-23 NOTE — PROGRESS NOTES
4 Eyes Skin Assessment     NAME:  Derw Chisholm  YOB: 1960  MEDICAL RECORD NUMBER:  [id number]    The patient is being assessed for  Admission    I agree that at least one RN has performed a thorough Head to Toe Skin Assessment on the patient. ALL assessment sites listed below have been assessed.      Areas assessed by both nurses:    Head, Face, Ears, Shoulders, Back, Chest, Arms, Elbows, Hands, Sacrum. Buttock, Coccyx, Ischium, and Legs. Feet and Heels        Does the Patient have a Wound? No noted wound(s)       Cesario Prevention initiated by RN: No  Wound Care Orders initiated by RN: No    Pressure Injury (Stage 3,4, Unstageable, DTI, NWPT, and Complex wounds) if present, place Wound referral order by RN under : No    New Ostomies, if present place, Ostomy referral order under : No     Nurse 1 eSignature: Electronically signed by Lisa Abrams RN on 5/22/25 at 9:37 PM EDT    **SHARE this note so that the co-signing nurse can place an eSignature**    Nurse 2 eSignature: Electronically signed by Gisela Colorado RN on 5/22/25 at 11:27 PM EDT

## 2025-05-23 NOTE — PROGRESS NOTES
[facility] - Physical Therapy   Phone: [phone]    Physical Therapy  Unit: [facility] 5W PROGRESSIVE CARE    [x] Initial Evaluation            [] Daily Treatment Note         [x] Discharge Summary      Patient: Drew Chisholm   : 1960   MRN: [id number]   Date of Service:  2025  Staff Mobility Recommendation: Up ad mary    AM-PAC score:   Discharge Recommendations: Drew Chisholm scored a  on the AM-PAC short mobility form.  At this time, no further PT is recommended upon discharge due to patient at independent level.  Recommend patient returns to prior setting with prior services.    Admitting Diagnosis: Vertigo  Ordering Physician: Dr. Frances  Current Admission Summary: 64 y/o male admit 2025 with Vertigo.  Sx over past 2 wks; seen by PCP and started on Meclizine (sxs improving). Insidious increases sx day of admit. PMH : Knee Surg, HTN.    Past Medical History:  has a past medical history of Acid reflux, Hyperlipidemia, and Hypertension.  Past Surgical History:  has a past surgical history that includes knee surgery and Colonoscopy (2015).    Clinical Assessment: PT Eval completed; + Vertiginous sxs with L Head turn/L Sidelying.  + Corey-Hallpike, mild/brief L Horizontal Nystagmus.  Completed L Epley Maneuver.  Pt respond/scott well.  Sxs resolved; denied with functional activity following completion.  Patient presenting at independent level for completion of required mobility tasks for return to home.  Eval with d/c at this time.  No therapy services indicated.      DME Required For Discharge: no DME required at discharge      Restrictions:  Precautions/Restrictions: UAL  Weight Bearing Restrictions: no restrictions    Required Braces/Orthotics: no braces required; does appear R chronic footdrop.    Positional Restrictions:no positional restrictions    Home Environment / Functional History  Lives With: Family  Type of Home: House  Home Layout: Two level, Able to Live on Main

## 2025-05-23 NOTE — PROGRESS NOTES
Discharge instructions, follow up appointments and medications reviewed with patient. Patient verbalized understanding. All questions answered, denies any further questions/concerns.  All belongings/medications sent with patient.     Patient's niece will transport patient home.    Electronically signed by Carol Paula RN on 5/23/2025 at 3:37 PM

## 2025-05-23 NOTE — PLAN OF CARE
Problem: Discharge Planning  Goal: Discharge to home or other facility with appropriate resources  5/23/2025 0953 by Carol Paula, RN  Outcome: Progressing

## 2025-05-23 NOTE — CARE COORDINATION
Discharge Planning:      (CM) reviewed the patient's chart to assess needs. Patient's Readmission Risk Score is   . Patient's medical insurance is  Payor: [employer]ARE / Plan: [employer] [place] MEDICARE / Product Type: *No Product type* / .  Patient's PCP is Jean-Paul Alatorre MD .  No needs anticipated, at this time. CM team to follow. Staff to inform CM if additional discharge needs arise.    Pts preferred pharmacy is   [id number] - [place], [place] - [address] - P [phone] - F [phone]  [address]  [facility] [address]  Phone: [phone] Fax: [phone]    [facility] - [place], [place] - [address] - P [phone] - F [phone]  [address]  [facility] [address]  Phone: [phone] Fax: [phone]    [facility] - [place], [place] - [address] - P [phone] - F [phone]  [address]  [facility] [address]  Phone: [phone] Fax: [phone]    [id number] - [place], [place] - [address] - P [phone] - F [phone]  [address]  [facility] [address]  Phone: [phone] Fax: [phone]     Respectfully submitted,    Laura Talavera-LEIGH ANN JeromeS  [facility]   [phone]    Electronically signed by KARL Neff, LSW on 5/23/2025 at 11:09 AM

## 2025-05-28 LAB
EKG ATRIAL RATE: 70 BPM
EKG DIAGNOSIS: NORMAL
EKG P AXIS: 64 DEGREES
EKG P-R INTERVAL: 150 MS
EKG Q-T INTERVAL: 368 MS
EKG QRS DURATION: 96 MS
EKG QTC CALCULATION (BAZETT): 397 MS
EKG R AXIS: -18 DEGREES
EKG T AXIS: 58 DEGREES
EKG VENTRICULAR RATE: 70 BPM

## 2025-05-30 NOTE — DISCHARGE SUMMARY
Hospital Medicine Discharge Summary    Patient ID: Drew Chisholm      Patient's PCP: Jean-Paul Alatorre MD    Admit Date: 5/22/2025     Discharge Date: 5/23/2025      Admitting Physician: No admitting provider for patient encounter.     Discharge Physician: Alon Osorio MD     Discharge Diagnoses:       Active Hospital Problems    Diagnosis     Vertigo [R42]        The patient was seen and examined on day of discharge and this discharge summary is in conjunction with any daily progress note from day of discharge.    Hospital Course:    Drew Chisholm is a 65 y.o. male with a past medical history significant for dyslipidemia and hypertension states that for the last 2 weeks or so, he has been having vertiginous symptoms, symptoms were actually getting better, saw his PCP, was started on meclizine and that seems to be helping but today out of the blue, symptoms got significantly worse, his gait got ataxic and he ended up coming to the ED.  Patient had a stroke team activation, NIH was 0, CT head, CTA of the head and neck is negative, being admitted for further evaluation management specifically to rule out a CVA.       Patient was admitted to hospitalist service    MRI head negative for acute CVA    PT and OT evaluation    5/23      Clinically stable  Vital signs stable  Denies chest pain or sob      Denies abdominal pain , nausea, vomiting , overt GI bleed , fever , chills         Physical Exam Performed:     /73   Pulse 63   Temp 98.2 °F (36.8 °C) (Oral)   Resp 14   Ht 1.803 m (5' 11\")   Wt 79.1 kg (174 lb 6.1 oz)   SpO2 95%   BMI 24.32 kg/m²       General appearance:  No apparent distress, appears stated age and cooperative.  HEENT:  Normal cephalic, atraumatic without obvious deformity. Pupils equal, round, and reactive to light.  Extra ocular muscles intact. Conjunctivae/corneas clear.  Neck: Supple, with full range of motion. No jugular venous distention. Trachea midline.  Respiratory: